# Patient Record
Sex: FEMALE | Race: OTHER | HISPANIC OR LATINO | URBAN - METROPOLITAN AREA
[De-identification: names, ages, dates, MRNs, and addresses within clinical notes are randomized per-mention and may not be internally consistent; named-entity substitution may affect disease eponyms.]

---

## 2020-03-15 ENCOUNTER — EMERGENCY (EMERGENCY)
Facility: HOSPITAL | Age: 50
LOS: 1 days | Discharge: HOME | End: 2020-03-15
Admitting: EMERGENCY MEDICINE
Payer: COMMERCIAL

## 2020-03-15 VITALS
HEIGHT: 64 IN | DIASTOLIC BLOOD PRESSURE: 95 MMHG | TEMPERATURE: 98 F | SYSTOLIC BLOOD PRESSURE: 185 MMHG | RESPIRATION RATE: 18 BRPM | WEIGHT: 164.91 LBS | HEART RATE: 79 BPM | OXYGEN SATURATION: 98 %

## 2020-03-15 DIAGNOSIS — R42 DIZZINESS AND GIDDINESS: ICD-10-CM

## 2020-03-15 DIAGNOSIS — Z90.49 ACQUIRED ABSENCE OF OTHER SPECIFIED PARTS OF DIGESTIVE TRACT: Chronic | ICD-10-CM

## 2020-03-15 PROCEDURE — 93010 ELECTROCARDIOGRAM REPORT: CPT | Mod: 59

## 2020-03-15 PROCEDURE — 99285 EMERGENCY DEPT VISIT HI MDM: CPT

## 2020-03-15 NOTE — ED ADULT TRIAGE NOTE - CHIEF COMPLAINT QUOTE
Pt complaining of dizziness, blurry vision, and bad balance at this time. Pt states that she had a bad headache yesterday.  Pt states that she was told by her neurologist that she has a "small tumor in my brain." Pt complaining of dizziness, blurry vision, and bad balance at this time. Pt states that she had a bad headache yesterday.  Pt states that she was told by her neurologist that she has a "small tumor in my brain." No slurred speech. No facial droop.

## 2020-03-15 NOTE — ED ADULT NURSE REASSESSMENT NOTE - NS ED NURSE REASSESS COMMENT FT1
Dr. Olivo aware of pt. Dr. Olivo aware of pt condition. EKG, fingerstick, orthostatic blood pressure in progress.

## 2020-03-16 VITALS
OXYGEN SATURATION: 99 % | RESPIRATION RATE: 16 BRPM | TEMPERATURE: 98 F | SYSTOLIC BLOOD PRESSURE: 148 MMHG | HEART RATE: 77 BPM | DIASTOLIC BLOOD PRESSURE: 80 MMHG

## 2020-03-16 LAB
ALBUMIN SERPL ELPH-MCNC: 3.9 G/DL — SIGNIFICANT CHANGE UP (ref 3.4–5)
ALP SERPL-CCNC: 85 U/L — SIGNIFICANT CHANGE UP (ref 40–120)
ALT FLD-CCNC: 40 U/L — SIGNIFICANT CHANGE UP (ref 12–42)
ANION GAP SERPL CALC-SCNC: 8 MMOL/L — LOW (ref 9–16)
AST SERPL-CCNC: 23 U/L — SIGNIFICANT CHANGE UP (ref 15–37)
BASOPHILS # BLD AUTO: 0.06 K/UL — SIGNIFICANT CHANGE UP (ref 0–0.2)
BASOPHILS NFR BLD AUTO: 1.1 % — SIGNIFICANT CHANGE UP (ref 0–2)
BILIRUB SERPL-MCNC: 0.2 MG/DL — SIGNIFICANT CHANGE UP (ref 0.2–1.2)
BUN SERPL-MCNC: 20 MG/DL — SIGNIFICANT CHANGE UP (ref 7–23)
CALCIUM SERPL-MCNC: 9.5 MG/DL — SIGNIFICANT CHANGE UP (ref 8.5–10.5)
CHLORIDE SERPL-SCNC: 105 MMOL/L — SIGNIFICANT CHANGE UP (ref 96–108)
CO2 SERPL-SCNC: 26 MMOL/L — SIGNIFICANT CHANGE UP (ref 22–31)
CREAT SERPL-MCNC: 0.69 MG/DL — SIGNIFICANT CHANGE UP (ref 0.5–1.3)
EOSINOPHIL # BLD AUTO: 0.29 K/UL — SIGNIFICANT CHANGE UP (ref 0–0.5)
EOSINOPHIL NFR BLD AUTO: 5.4 % — SIGNIFICANT CHANGE UP (ref 0–6)
GLUCOSE SERPL-MCNC: 128 MG/DL — HIGH (ref 70–99)
HCT VFR BLD CALC: 38.4 % — SIGNIFICANT CHANGE UP (ref 34.5–45)
HGB BLD-MCNC: 13 G/DL — SIGNIFICANT CHANGE UP (ref 11.5–15.5)
IMM GRANULOCYTES NFR BLD AUTO: 0.2 % — SIGNIFICANT CHANGE UP (ref 0–1.5)
LYMPHOCYTES # BLD AUTO: 2.75 K/UL — SIGNIFICANT CHANGE UP (ref 1–3.3)
LYMPHOCYTES # BLD AUTO: 51.6 % — HIGH (ref 13–44)
MCHC RBC-ENTMCNC: 30.7 PG — SIGNIFICANT CHANGE UP (ref 27–34)
MCHC RBC-ENTMCNC: 33.9 GM/DL — SIGNIFICANT CHANGE UP (ref 32–36)
MCV RBC AUTO: 90.8 FL — SIGNIFICANT CHANGE UP (ref 80–100)
MONOCYTES # BLD AUTO: 0.51 K/UL — SIGNIFICANT CHANGE UP (ref 0–0.9)
MONOCYTES NFR BLD AUTO: 9.6 % — SIGNIFICANT CHANGE UP (ref 2–14)
NEUTROPHILS # BLD AUTO: 1.71 K/UL — LOW (ref 1.8–7.4)
NEUTROPHILS NFR BLD AUTO: 32.1 % — LOW (ref 43–77)
NRBC # BLD: 0 /100 WBCS — SIGNIFICANT CHANGE UP (ref 0–0)
PLATELET # BLD AUTO: 257 K/UL — SIGNIFICANT CHANGE UP (ref 150–400)
POTASSIUM SERPL-MCNC: 4 MMOL/L — SIGNIFICANT CHANGE UP (ref 3.5–5.3)
POTASSIUM SERPL-SCNC: 4 MMOL/L — SIGNIFICANT CHANGE UP (ref 3.5–5.3)
PROT SERPL-MCNC: 7.9 G/DL — SIGNIFICANT CHANGE UP (ref 6.4–8.2)
RBC # BLD: 4.23 M/UL — SIGNIFICANT CHANGE UP (ref 3.8–5.2)
RBC # FLD: 12.5 % — SIGNIFICANT CHANGE UP (ref 10.3–14.5)
SODIUM SERPL-SCNC: 139 MMOL/L — SIGNIFICANT CHANGE UP (ref 132–145)
TROPONIN I SERPL-MCNC: <0.017 NG/ML — LOW (ref 0.02–0.06)
WBC # BLD: 5.33 K/UL — SIGNIFICANT CHANGE UP (ref 3.8–10.5)
WBC # FLD AUTO: 5.33 K/UL — SIGNIFICANT CHANGE UP (ref 3.8–10.5)

## 2020-03-16 PROCEDURE — 71046 X-RAY EXAM CHEST 2 VIEWS: CPT | Mod: 26

## 2020-03-16 PROCEDURE — 70450 CT HEAD/BRAIN W/O DYE: CPT | Mod: 26

## 2020-03-16 RX ORDER — MECLIZINE HCL 12.5 MG
25 TABLET ORAL ONCE
Refills: 0 | Status: COMPLETED | OUTPATIENT
Start: 2020-03-16 | End: 2020-03-16

## 2020-03-16 RX ORDER — SODIUM CHLORIDE 9 MG/ML
1000 INJECTION INTRAMUSCULAR; INTRAVENOUS; SUBCUTANEOUS ONCE
Refills: 0 | Status: COMPLETED | OUTPATIENT
Start: 2020-03-16 | End: 2020-03-16

## 2020-03-16 RX ORDER — DIPHENHYDRAMINE HCL 50 MG
25 CAPSULE ORAL ONCE
Refills: 0 | Status: COMPLETED | OUTPATIENT
Start: 2020-03-16 | End: 2020-03-16

## 2020-03-16 RX ORDER — METOCLOPRAMIDE HCL 10 MG
10 TABLET ORAL ONCE
Refills: 0 | Status: COMPLETED | OUTPATIENT
Start: 2020-03-16 | End: 2020-03-16

## 2020-03-16 RX ADMIN — Medication 101 MILLIGRAM(S): at 02:43

## 2020-03-16 RX ADMIN — SODIUM CHLORIDE 1000 MILLILITER(S): 9 INJECTION INTRAMUSCULAR; INTRAVENOUS; SUBCUTANEOUS at 02:43

## 2020-03-16 RX ADMIN — Medication 25 MILLIGRAM(S): at 02:43

## 2020-03-16 RX ADMIN — Medication 104 MILLIGRAM(S): at 02:43

## 2020-03-16 NOTE — ED ADULT NURSE NOTE - OBJECTIVE STATEMENT
Pt presents to ED c/o headache since yesterday and x2 episodes of dizziness described as room spinning and unsteady gait. Pt has hx of brain tumor and stroke dx x2 years ago, has not followed up. Pt denies any numbness/tingling, no neuro deficits. Pt is A&Ox4, neuro/sensory intact, full ROM to extremities, equal facial symmetry.

## 2020-03-16 NOTE — ED ADULT NURSE NOTE - CHPI ED NUR SYMPTOMS NEG
no vomiting/no numbness/no weakness/no confusion/no change in level of consciousness/no fever/no loss of consciousness/no nausea/no blurred vision

## 2020-03-16 NOTE — ED ADULT NURSE NOTE - NSIMPLEMENTINTERV_GEN_ALL_ED
Implemented All Fall Risk Interventions:  [place] to call system. Call bell, personal items and telephone within reach. Instruct patient to call for assistance. Room bathroom lighting operational. Non-slip footwear when patient is off stretcher. Physically safe environment: no spills, clutter or unnecessary equipment. Stretcher in lowest position, wheels locked, appropriate side rails in place. Provide visual cue, wrist band, yellow gown, etc. Monitor gait and stability. Monitor for mental status changes and reorient to person, place, and time. Review medications for side effects contributing to fall risk. Reinforce activity limits and safety measures with patient and family.

## 2020-03-16 NOTE — ED PROVIDER NOTE - CARE PLAN
Assessment and plan of treatment:	assess  treat ct reassess   neuro f/u Principal Discharge DX:	Vertigo  Assessment and plan of treatment:	assess  treat ct reassess   neuro f/u

## 2020-03-16 NOTE — ED PROVIDER NOTE - NSFOLLOWUPINSTRUCTIONS_ED_ALL_ED_FT
What are dizziness and vertigo? — Dizziness is a feeling that is sometimes hard to describe. It often makes you feel like you are about to fall or pass out. Dizziness can also cause you to feel lightheaded or make it hard for you to walk straight.    Vertigo is a type of dizziness that makes you feel like you are spinning, swaying, or tilting, or like the room is moving around you. These feelings come and go, and might last seconds, hours, or days. You might feel worse when you move your head, change positions, cough, or sneeze.    Some people with vertigo have trouble walking. Some people with vertigo have nausea and might vomit.    What causes vertigo? — The most common causes of vertigo include:    ?Inner ear problems – Deep inside the ear, there is a small network of tubes that are filled with fluid. Floating inside that fluid are special calcium deposits. Together, these tubes and deposits make up the "vestibular system." This system tells the brain what position the body is in. It also helps keep you balanced (figure 1).      If the tubes inside your inner ear get swollen, or if they form extra calcium deposits, you can develop vertigo and balance problems. You can also get vertigo if swelling puts pressure on nerves in the inner ear. Sometimes this swelling is caused by a viral infection.      ?Head injury – Head injuries and concussion can disturb the inner ear and lead to vertigo.      ?Medicines – Some medicines can damage the inner ear and cause vertigo.      ?Migraine headaches – Migraine headaches can sometimes cause vertigo.      ?Brain problems – Brain problems, such as stroke or multiple sclerosis, can also cause vertigo.      Should I see a doctor or nurse? — See your doctor or nurse right away if you have vertigo and:    ?Have a new or severe headache    ?Have a fever higher than 100.4ºF (38ºC)    ?Start to see double or have trouble seeing clearly    ?Have trouble speaking or hearing    ?Have weakness in an arm or leg or your face droops to one side    ?Pass out    ?Have numbness or tingling    ?Have chest pain    ?Cannot stop vomiting      You should also see your doctor or nurse if you have vertigo that lasts for several minutes or more and you:    ?Are older than 60    ?Had a stroke in the past    ?Are at risk for having a stroke, for example because you have diabetes or you smoke      If you have dizziness or vertigo that comes and goes but you do not have any of the problems listed above, make an appointment with your doctor or nurse.    Will I need tests? — Maybe. Your doctor will start by learning about your symptoms and doing an exam. During the exam, he or she will check:    ?Your hearing    ?How you walk and keep your balance    ?How your eyes work when you watch a moving object, and when your head is turned from side to side      Depending on what your doctor finds during the exam, he or she might order more tests to better understand your hearing or balance problems. In some cases, the doctor will order an MRI of your brain. An MRI is an imaging test that creates pictures of the inside of your body.    How is vertigo treated? — If your doctor knows what is causing your vertigo, he or she will probably try to treat that problem directly. For instance, if you have calcium deposits in your inner ear, the doctor might try to get them out by moving your head in a special way.    Your doctor can also give you medicines that might help your vertigo and relieve nausea and vomiting.    If your vertigo is really bad, your doctor might also suggest a treatment called "balance rehabilitation." This treatment teaches you exercises that can help you cope with your vertigo.    What can I do on my own to deal with my vertigo? — If you have trouble standing or walking because of vertigo, you are at risk of falling. To reduce the risk of falls, make your home as safe as possible. Get rid of loose electrical cords, clutter, and slippery rugs. Also, make sure that you wear sturdy, non-slip shoes, and that your walkways are clear and well lit.

## 2020-03-16 NOTE — ED ADULT NURSE NOTE - CHIEF COMPLAINT QUOTE
Pt complaining of dizziness, blurry vision, and bad balance at this time. Pt states that she had a bad headache yesterday.  Pt states that she was told by her neurologist that she has a "small tumor in my brain." No slurred speech. No facial droop.

## 2020-03-16 NOTE — ED PROVIDER NOTE - OBJECTIVE STATEMENT
49 y/o female now here for dizziness this evening described as the room spinning with a feeling of imbalance. Patient denies chest pain,nausea,vomiting,diarrhea,fevers,chills,sob,trauma,loc. Now asymptomatic.